# Patient Record
Sex: MALE | Race: WHITE | Employment: UNEMPLOYED | URBAN - METROPOLITAN AREA
[De-identification: names, ages, dates, MRNs, and addresses within clinical notes are randomized per-mention and may not be internally consistent; named-entity substitution may affect disease eponyms.]

---
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
## 2024-03-11 ENCOUNTER — OFFICE VISIT (OUTPATIENT)
Age: 24
End: 2024-03-11

## 2024-03-11 VITALS
TEMPERATURE: 98.4 F | SYSTOLIC BLOOD PRESSURE: 144 MMHG | HEART RATE: 70 BPM | DIASTOLIC BLOOD PRESSURE: 88 MMHG | WEIGHT: 216.8 LBS | OXYGEN SATURATION: 98 %

## 2024-03-11 DIAGNOSIS — R11.2 NAUSEA AND VOMITING, UNSPECIFIED VOMITING TYPE: Primary | ICD-10-CM

## 2024-03-11 DIAGNOSIS — I10 HYPERTENSION, UNSPECIFIED TYPE: ICD-10-CM

## 2024-03-11 RX ORDER — ONDANSETRON 4 MG/1
4 TABLET, ORALLY DISINTEGRATING ORAL 3 TIMES DAILY PRN
Qty: 21 TABLET | Refills: 0 | Status: SHIPPED | OUTPATIENT
Start: 2024-03-11

## 2024-03-11 ASSESSMENT — ENCOUNTER SYMPTOMS: VOMITING: 1

## 2024-03-11 NOTE — PROGRESS NOTES
normal.      Nose: Nose normal.      Mouth/Throat:      Lips: Pink.   Eyes:      General: Lids are normal.      Conjunctiva/sclera: Conjunctivae normal.   Cardiovascular:      Rate and Rhythm: Normal rate and regular rhythm.      Heart sounds: Normal heart sounds.   Pulmonary:      Effort: Pulmonary effort is normal.      Breath sounds: Normal breath sounds.   Abdominal:      Palpations: Abdomen is soft.   Musculoskeletal:      Cervical back: No rigidity or tenderness.   Lymphadenopathy:      Cervical: No cervical adenopathy.   Skin:     General: Skin is warm and dry.   Neurological:      Mental Status: He is alert and oriented to person, place, and time.   Psychiatric:         Behavior: Behavior is cooperative.           An electronic signature was used to authenticate this note.     GIANFRANCO Garza - CNP

## 2024-04-22 ENCOUNTER — OFFICE VISIT (OUTPATIENT)
Age: 24
End: 2024-04-22

## 2024-04-22 VITALS
DIASTOLIC BLOOD PRESSURE: 79 MMHG | OXYGEN SATURATION: 98 % | WEIGHT: 221 LBS | SYSTOLIC BLOOD PRESSURE: 127 MMHG | TEMPERATURE: 98.3 F | HEART RATE: 71 BPM

## 2024-04-22 DIAGNOSIS — M54.50 ACUTE MIDLINE LOW BACK PAIN WITHOUT SCIATICA: Primary | ICD-10-CM

## 2024-04-22 RX ORDER — CYCLOBENZAPRINE HCL 10 MG
10 TABLET ORAL 3 TIMES DAILY PRN
Qty: 21 TABLET | Refills: 0 | Status: SHIPPED | OUTPATIENT
Start: 2024-04-22 | End: 2024-05-02

## 2024-04-22 ASSESSMENT — ENCOUNTER SYMPTOMS
BOWEL INCONTINENCE: 0
ABDOMINAL PAIN: 0
VOMITING: 0
SHORTNESS OF BREATH: 0
CHEST TIGHTNESS: 0
COUGH: 0
NAUSEA: 0
BACK PAIN: 1

## 2024-04-22 NOTE — PROGRESS NOTES
Bang Arora (:  2000) is a 24 y.o. male,Established patient, here for evaluation of the following chief complaint(s):  Back Pain (Back pain, whole back, pain in bilateral ribs, trouble breathing due to pain x 3 days)      ASSESSMENT/PLAN:    ICD-10-CM    1. Acute midline low back pain without sciatica  M54.50 cyclobenzaprine (FLEXERIL) 10 MG tablet        Take ibuprofen or acetaminophen following OTC instructions for back pain. Apply ice four times a day for 20 minutes to affected area. Do not apply ice directly to skin.   A muscle relaxer may help with pain. Muscle relaxer may cause drowsiness. Do not drive or operate heavy machinery while taking a muscle relaxer. Stretches can help improve back pain and tighten your core muscles to prevent back pain from reoccurring. The link has some back stretches that may help with your back pain. Do not drive or operate heavy machinery while taking muscle relaxer.    https://www.[employer]/health/lower-back-stretches    https://health.gov/[place]health[place]/healthy-living/safety/prevent-back-pain     Follow up in 7 days if symptoms persist or if symptoms worsen.    SUBJECTIVE/OBJECTIVE:    Back Pain  This is a new problem. The current episode started in the past 7 days. The problem occurs daily. The problem is unchanged. The pain is present in the lumbar spine. The pain does not radiate. The pain is at a severity of 5/10. The pain is moderate. The symptoms are aggravated by bending and twisting. Pertinent negatives include no abdominal pain, bladder incontinence, bowel incontinence, chest pain, headaches, paresis, paresthesias, pelvic pain or weakness. Risk factors include sedentary lifestyle. He has tried NSAIDs for the symptoms. The treatment provided mild relief.           Vitals:    24 1540   BP: 127/79   Site: Right Upper Arm   Position: Sitting   Cuff Size: Medium Adult   Pulse: 71   Temp: 98.3 °F (36.8 °C)   TempSrc: Oral   SpO2: 98%   Weight: 100.2 kg